# Patient Record
Sex: FEMALE | Race: WHITE
[De-identification: names, ages, dates, MRNs, and addresses within clinical notes are randomized per-mention and may not be internally consistent; named-entity substitution may affect disease eponyms.]

---

## 2017-11-14 ENCOUNTER — HOSPITAL ENCOUNTER (OUTPATIENT)
Dept: HOSPITAL 47 - LABWHC1 | Age: 77
Discharge: HOME | End: 2017-11-14
Payer: MEDICARE

## 2017-11-14 DIAGNOSIS — I10: Primary | ICD-10-CM

## 2017-11-14 DIAGNOSIS — E78.2: ICD-10-CM

## 2017-11-14 LAB
ALP SERPL-CCNC: 85 U/L (ref 38–126)
ALT SERPL-CCNC: 29 U/L (ref 9–52)
ANION GAP SERPL CALC-SCNC: 7 MMOL/L
AST SERPL-CCNC: 21 U/L (ref 14–36)
BASOPHILS # BLD AUTO: 0.1 K/UL (ref 0–0.2)
BASOPHILS NFR BLD AUTO: 1 %
BUN SERPL-SCNC: 12 MG/DL (ref 7–17)
CALCIUM SPEC-MCNC: 10.3 MG/DL (ref 8.4–10.2)
CH: 31.2
CHCM: 32.7
CHLORIDE SERPL-SCNC: 101 MMOL/L (ref 98–107)
CHOLEST SERPL-MCNC: 193 MG/DL (ref ?–200)
CO2 SERPL-SCNC: 31 MMOL/L (ref 22–30)
EOSINOPHIL # BLD AUTO: 0.2 K/UL (ref 0–0.7)
EOSINOPHIL NFR BLD AUTO: 3 %
ERYTHROCYTE [DISTWIDTH] IN BLOOD BY AUTOMATED COUNT: 4.54 M/UL (ref 3.8–5.4)
ERYTHROCYTE [DISTWIDTH] IN BLOOD: 13.3 % (ref 11.5–15.5)
GLUCOSE SERPL-MCNC: 99 MG/DL (ref 74–99)
HCT VFR BLD AUTO: 43.6 % (ref 34–46)
HDLC SERPL-MCNC: 84 MG/DL (ref 40–60)
HDW: 2.45
HGB BLD-MCNC: 13.5 GM/DL (ref 11.4–16)
LUC NFR BLD AUTO: 3 %
LYMPHOCYTES # SPEC AUTO: 2.7 K/UL (ref 1–4.8)
LYMPHOCYTES NFR SPEC AUTO: 37 %
MCH RBC QN AUTO: 29.8 PG (ref 25–35)
MCHC RBC AUTO-ENTMCNC: 31.1 G/DL (ref 31–37)
MCV RBC AUTO: 95.9 FL (ref 80–100)
MONOCYTES # BLD AUTO: 0.5 K/UL (ref 0–1)
MONOCYTES NFR BLD AUTO: 6 %
NEUTROPHILS # BLD AUTO: 3.6 K/UL (ref 1.3–7.7)
NEUTROPHILS NFR BLD AUTO: 50 %
NON-AFRICAN AMERICAN GFR(MDRD): >60
POTASSIUM SERPL-SCNC: 4.1 MMOL/L (ref 3.5–5.1)
PROT SERPL-MCNC: 7.3 G/DL (ref 6.3–8.2)
SODIUM SERPL-SCNC: 139 MMOL/L (ref 137–145)
WBC # BLD AUTO: 0.2 10*3/UL
WBC # BLD AUTO: 7.1 K/UL (ref 3.8–10.6)
WBC (PEROX): 7.37

## 2017-11-14 PROCEDURE — 85025 COMPLETE CBC W/AUTO DIFF WBC: CPT

## 2017-11-14 PROCEDURE — 36415 COLL VENOUS BLD VENIPUNCTURE: CPT

## 2017-11-14 PROCEDURE — 80061 LIPID PANEL: CPT

## 2017-11-14 PROCEDURE — 80053 COMPREHEN METABOLIC PANEL: CPT

## 2018-09-05 ENCOUNTER — HOSPITAL ENCOUNTER (OUTPATIENT)
Dept: HOSPITAL 47 - RADUSWWP | Age: 78
Discharge: HOME | End: 2018-09-05
Attending: FAMILY MEDICINE
Payer: MEDICARE

## 2018-09-05 DIAGNOSIS — N83.8: Primary | ICD-10-CM

## 2018-09-05 DIAGNOSIS — R93.5: ICD-10-CM

## 2018-09-05 PROCEDURE — 76856 US EXAM PELVIC COMPLETE: CPT

## 2018-09-05 PROCEDURE — 76830 TRANSVAGINAL US NON-OB: CPT

## 2018-09-05 PROCEDURE — 76700 US EXAM ABDOM COMPLETE: CPT

## 2018-09-05 NOTE — US
EXAMINATION TYPE: US abdomen complete

 

DATE OF EXAM: 9/5/2018

 

COMPARISON: None

 

CLINICAL HISTORY: 78-year-old female Abdominal Pain R10.9, abdominal bloating, feeling of fullness

 

FINDINGS:

 

EXAM MEASUREMENTS:

 

Liver Length:  16.1 cm   

Gallbladder Wall:  0.3 cm   

CBD:  0.4 cm

Spleen:  8.1 cm   

Right Kidney:  10.2 x 3.4 x 5.2 cm 

Left Kidney:  9.7 x 4.8 x 4.7cm   

 

Sonographer notes: Patient of large body habitus, technically difficult exam.

 

Pancreas:  Head partially obscured by overlying bowel gas

Liver: Cyst seen on previous CT not seen by today's ultrasound, this could be due to technical limita
tions  

Gallbladder: A couple nonmobile echogenic foci measuring 3 mm

**Evidence for sonographic Davila's sign: no

CBD:  wnl 

Spleen:  wnl   

Right Kidney:  upper pole obscured by overlying bowel gas   

Left Kidney:  upper pole obscured by overlying bowel gas   

No hydronephrosis on either side.

Upper IVC:  wnl  

Abd Aorta:  wnl

 

 

 

IMPRESSION: 

A couple nonmobile echogenic foci in the gallbladder measuring 3 mm suggestive of gallbladder wall po
lyps. A 6 month follow-up can be considered as a conservative measure.

 

Otherwise, some exam limitations; no specific abnormality seen.

## 2018-09-05 NOTE — US
EXAMINATION TYPE: US pelvis complete transvag

 

DATE OF EXAM: 9/5/2018

 

COMPARISON: NONE

 

CLINICAL HISTORY: 78-year-old female Abdominal Pain R10.9, bloating, feeling of fullness

 

TECHNIQUE: Transabdominal sonographic images of the pelvis were acquired.  Transvaginal sonographic i
mages were medically necessary to better assess the following anatomy: uterus and ovaries

 

Date of LMP:  24 years prior

 

FINDINGS:

 

EXAM MEASUREMENTS:

 

Uterus:  4.9 x 2.4 x 3.5 cm

Endometrial Stripe: 0.4 cm

Right Ovary:  not clearly identified, cyst seen in right adnexa possibly ovarian etiology.

Left Ovary:  not visualized due to atrophy/overlying bowel

 

Sonography notes: Patient of large body habitus. 

 

1. Uterus:  Retroverted, heterogeneous, unable to adequately image the uterus transvaginally due to u
terine position.

2. Endometrium:  wnl

3. Right Ovary:  Right adnexal cyst identified in post menopausal patient measuring 4.4 x 3.8 x 3.6cm
. There is a thin internal septation. No internal nodularity identified. 

4. Left Ovary: not visualized due to atrophy/overlying bowel

5. Bilateral Adnexa: right cyst seen

6. Posterior cul-de-sac:  wnl

 

 

 

IMPRESSION: 

Neither ovary could be clearly identified. There is a cystic lesion in the right adnexa probably of o
varian origin measuring 4.4 cm containing a thin internal septation. Recommend 3-6 month initial foll
ow-up interval. If stable at that time, annual surveillance can be performed per consensus guidelines
.

## 2018-12-12 ENCOUNTER — HOSPITAL ENCOUNTER (OUTPATIENT)
Dept: HOSPITAL 47 - ORWHC2ENDO | Age: 78
Discharge: HOME | End: 2018-12-12
Payer: MEDICARE

## 2018-12-12 VITALS — DIASTOLIC BLOOD PRESSURE: 79 MMHG | SYSTOLIC BLOOD PRESSURE: 164 MMHG | HEART RATE: 75 BPM

## 2018-12-12 VITALS — BODY MASS INDEX: 32.5 KG/M2

## 2018-12-12 VITALS — RESPIRATION RATE: 16 BRPM

## 2018-12-12 VITALS — TEMPERATURE: 98.4 F

## 2018-12-12 DIAGNOSIS — I10: ICD-10-CM

## 2018-12-12 DIAGNOSIS — K21.0: ICD-10-CM

## 2018-12-12 DIAGNOSIS — K22.10: ICD-10-CM

## 2018-12-12 DIAGNOSIS — Z96.642: ICD-10-CM

## 2018-12-12 DIAGNOSIS — K44.9: ICD-10-CM

## 2018-12-12 DIAGNOSIS — Z96.651: ICD-10-CM

## 2018-12-12 DIAGNOSIS — Z94.7: ICD-10-CM

## 2018-12-12 DIAGNOSIS — Z79.899: ICD-10-CM

## 2018-12-12 DIAGNOSIS — Z88.1: ICD-10-CM

## 2018-12-12 DIAGNOSIS — Z91.041: ICD-10-CM

## 2018-12-12 DIAGNOSIS — K29.30: Primary | ICD-10-CM

## 2018-12-12 DIAGNOSIS — M54.9: ICD-10-CM

## 2018-12-12 DIAGNOSIS — E78.5: ICD-10-CM

## 2018-12-12 DIAGNOSIS — M19.90: ICD-10-CM

## 2018-12-12 PROCEDURE — 88342 IMHCHEM/IMCYTCHM 1ST ANTB: CPT

## 2018-12-12 PROCEDURE — 43239 EGD BIOPSY SINGLE/MULTIPLE: CPT

## 2018-12-12 PROCEDURE — 88305 TISSUE EXAM BY PATHOLOGIST: CPT

## 2018-12-12 NOTE — P.PCN
Date of Procedure: 12/12/18


Description of Procedure: 











PREOPERATIVE DIAGNOSIS:


Gastroesophageal reflux disease.





POSTOPERATIVE DIAGNOSIS:


Gastritis.


Gastroesophageal reflux disease.


Diaphragmatic hiatal hernia





OPERATION:


Esophagogastroduodenoscopy with biopsies along antrum.





SURGEON: Shefali Lazcano MD





ANESTHESIA: MAC.





INDICATIONS:


The patient is a 78-year-old female who presents with a history of reflux 

disease. Benefits and risks of the procedure were described. Informed consent 

was obtained.





DESCRIPTION:


The patient was brought into the endoscopy suite and laid in the left lateral 

decubitus position. An Olympus gastroscope was passed along the posterior 

oropharynx down to the distal esophagus where the squamocolumnar junction was 

encountered at 36 cm from the incisors. The stomach was entered and no bile 

reflux was found.  Additional findings are listed below. Biopsies with cold 

forceps were obtained of the antrum. The first through third portion of the 

duodenum was examined and unremarkable. Retroflexion of the scope confirmed  

Hill grade 4 lower esophageal valve. The squamocolumnar junction demonstrated 

LA grade A erosive esophagitis. The stomach was desufflated. The patient 

tolerated the procedure well.





FINDINGS:


Squamocolumnar junction 36 cm from the incisors.


Diaphragmatic hiatus at 40 cm.


Hiatal hernia, 4 cm


Hill grade 4 lower esophageal valve.


LA grade A erosive esophagitis.


No active duodenitis.


Mild superficial chronic gastritis





RECOMMENDATIONS:


Upper endoscopy as needed.

## 2018-12-12 NOTE — P.GSHP
History of Present Illness


H&P Date: 12/12/18














CHIEF COMPLAINT: GERD





HISTORY OF PRESENT ILLNESS: The patient is a 78-year-old female who


presents reports gastroesophageal reflux disease.  Upper endoscopy was offered 

for further evaluation and management.





PAST MEDICAL HISTORY: 


Please see list.





PAST SURGICAL HISTORY: 


Please see list.





MEDICATIONS: 


Please see list.





ALLERGIES:  Please see list. 





SOCIAL HISTORY: No illicit drug use





FAMILY HISTORY: No reports of Crohn disease or ulcerative colitis. 





REVIEW OF ORGAN SYSTEMS: 


CONSTITUTIONAL: No reports of fevers or chills. 


GI:  Denies any blood in stools or constipation. 





PHYSICAL EXAM: 


VITAL SIGNS:  Stable


GENERAL: Well-developed and pleasant in no acute distress. 


HEENT: No scleral icterus. Extraocular movements grossly


intact. Moist buccal mucosa. 


NECK: Supple without lymphadenopathy. 


CHEST: Unlabored respirations. Equal bilateral excursions. 


CARDIOVASCULAR: Regular rate and rhythm. Distal 2+ pulses. 


ABDOMEN: Soft, nondistended.  


MUSCULOSKELETAL: No clubbing, cyanosis, or edema. 





ASSESSMENT: 


1.  Gastroesophageal reflux disease





PLAN: 


1. Recommend proceeding with an upper endoscopy





Past Medical History


Past Medical History: Hyperlipidemia, Hypertension, Osteoarthritis (OA)


Additional Past Medical History / Comment(s): BACK PAIN, ENVIRONMENTAL 

ALLERGIES., STATES HAVING STOMACH PAIN AFTER EATING.


History of Any Multi-Drug Resistant Organisms: None Reported


Past Surgical History: Joint Replacement, Orthopedic Surgery, Tubal Ligation


Additional Past Surgical History / Comment(s): BILAT CATARACT SURGERY, CARPAL 

TUNNEL.  LT CORNEAL TRANSPLANT X2.  COLONOSCOPY.  RT TKA.  LT PARTIAL RADHA


Past Anesthesia/Blood Transfusion Reactions: No Reported Reaction


Past Psychological History: No Psychological Hx Reported


Smoking Status: Never smoker


Past Alcohol Use History: None Reported


Past Drug Use History: None Reported





- Past Family History


  ** Mother


Family Medical History: Unable to Obtain


Additional Family Medical History / Comment(s): PT DOES NOT KNOW FAMILY HX.





Medications and Allergies


 Home Medications











 Medication  Instructions  Recorded  Confirmed  Type


 


Diltiazem HCl [Diltiazem ER (BID)] 90 mg PO BID 05/03/14 12/10/18 History


 


Lisinopril-Hctz 20-25 mg 1 each PO DAILY 05/03/14 12/10/18 History





[Zestoretic 20-25]    


 


Atorvastatin [Lipitor] 10 mg PO HS 12/10/18 12/10/18 History


 


Cetirizine HCl 10 mg PO HS PRN 12/10/18 12/10/18 History


 


HYDROcodone/APAP 5-325MG [Norco 1 tab PO Q6HR PRN 12/10/18 12/10/18 History





5-325]    


 


Inulin/Chromium Picolinate [Fiber 2 each PO DAILY 12/10/18 12/10/18 History





Gummies Chew]    


 


Loratadine 10 mg PO DAILY PRN 12/10/18 12/10/18 History


 


Sinus Otc 1 tab PO DAILY PRN 12/10/18  History


 


Sodium Chloride 5% Ophth Soln 1 drops RIGHT EYE BID 12/10/18 12/10/18 History





[Lenin 128]    


 


prednisoLONE ACETATE 1% OPHTH 1 drops LEFT EYE AS DIRECTED 12/10/18 12/10/18 

History





[Pred Forte 1%]    











 Allergies











Allergy/AdvReac Type Severity Reaction Status Date / Time


 


Iodinated Contrast- Oral and AdvReac  Nausea & Verified 12/10/18 08:19





IV Dye   Vomiting  





[Iodinated Contrast Media -     





IV Dye]

## 2020-09-03 ENCOUNTER — HOSPITAL ENCOUNTER (OUTPATIENT)
Dept: HOSPITAL 47 - LABPAT | Age: 80
Discharge: HOME | End: 2020-09-03
Attending: ORTHOPAEDIC SURGERY
Payer: MEDICARE

## 2020-09-03 DIAGNOSIS — Z01.812: Primary | ICD-10-CM

## 2020-09-03 DIAGNOSIS — Z01.818: ICD-10-CM

## 2020-09-03 LAB
ALBUMIN SERPL-MCNC: 4.3 G/DL (ref 3.5–5)
ALP SERPL-CCNC: 115 U/L (ref 38–126)
ALT SERPL-CCNC: 13 U/L (ref 4–34)
ANION GAP SERPL CALC-SCNC: 7 MMOL/L
APTT BLD: 22.5 SEC (ref 22–30)
AST SERPL-CCNC: 24 U/L (ref 14–36)
BUN SERPL-SCNC: 14 MG/DL (ref 7–17)
CALCIUM SPEC-MCNC: 10 MG/DL (ref 8.4–10.2)
CHLORIDE SERPL-SCNC: 101 MMOL/L (ref 98–107)
CO2 SERPL-SCNC: 29 MMOL/L (ref 22–30)
ERYTHROCYTE [DISTWIDTH] IN BLOOD BY AUTOMATED COUNT: 4.38 M/UL (ref 3.8–5.4)
ERYTHROCYTE [DISTWIDTH] IN BLOOD: 13.4 % (ref 11.5–15.5)
GLUCOSE SERPL-MCNC: 99 MG/DL (ref 74–99)
HCT VFR BLD AUTO: 41.3 % (ref 34–46)
HGB BLD-MCNC: 13.1 GM/DL (ref 11.4–16)
INR PPP: 0.9 (ref ?–1.2)
MCH RBC QN AUTO: 30 PG (ref 25–35)
MCHC RBC AUTO-ENTMCNC: 31.9 G/DL (ref 31–37)
MCV RBC AUTO: 94.3 FL (ref 80–100)
PH UR: 6.5 [PH] (ref 5–8)
PLATELET # BLD AUTO: 282 K/UL (ref 150–450)
POTASSIUM SERPL-SCNC: 4.1 MMOL/L (ref 3.5–5.1)
PROT SERPL-MCNC: 6.8 G/DL (ref 6.3–8.2)
PT BLD: 9.7 SEC (ref 9–12)
RBC UR QL: 2 /HPF (ref 0–5)
SODIUM SERPL-SCNC: 137 MMOL/L (ref 137–145)
SP GR UR: 1.02 (ref 1–1.03)
SQUAMOUS UR QL AUTO: 1 /HPF (ref 0–4)
UROBILINOGEN UR QL STRIP: <2 MG/DL (ref ?–2)
WBC # BLD AUTO: 8.2 K/UL (ref 3.8–10.6)
WBC #/AREA URNS HPF: 10 /HPF (ref 0–5)

## 2020-09-03 PROCEDURE — 85610 PROTHROMBIN TIME: CPT

## 2020-09-03 PROCEDURE — 85027 COMPLETE CBC AUTOMATED: CPT

## 2020-09-03 PROCEDURE — 80053 COMPREHEN METABOLIC PANEL: CPT

## 2020-09-03 PROCEDURE — 87070 CULTURE OTHR SPECIMN AEROBIC: CPT

## 2020-09-03 PROCEDURE — 85730 THROMBOPLASTIN TIME PARTIAL: CPT

## 2020-09-03 PROCEDURE — 81001 URINALYSIS AUTO W/SCOPE: CPT

## 2020-09-14 ENCOUNTER — HOSPITAL ENCOUNTER (OUTPATIENT)
Dept: HOSPITAL 47 - OR | Age: 80
Discharge: HOME HEALTH | End: 2020-09-14
Attending: ORTHOPAEDIC SURGERY
Payer: MEDICARE

## 2020-09-14 VITALS — TEMPERATURE: 97.5 F | HEART RATE: 103 BPM | SYSTOLIC BLOOD PRESSURE: 144 MMHG | DIASTOLIC BLOOD PRESSURE: 72 MMHG

## 2020-09-14 VITALS — BODY MASS INDEX: 32.2 KG/M2

## 2020-09-14 VITALS — RESPIRATION RATE: 18 BRPM

## 2020-09-14 DIAGNOSIS — K44.9: ICD-10-CM

## 2020-09-14 DIAGNOSIS — J30.2: ICD-10-CM

## 2020-09-14 DIAGNOSIS — Z97.3: ICD-10-CM

## 2020-09-14 DIAGNOSIS — Z98.42: ICD-10-CM

## 2020-09-14 DIAGNOSIS — Z83.1: ICD-10-CM

## 2020-09-14 DIAGNOSIS — I10: ICD-10-CM

## 2020-09-14 DIAGNOSIS — Z88.1: ICD-10-CM

## 2020-09-14 DIAGNOSIS — K57.90: ICD-10-CM

## 2020-09-14 DIAGNOSIS — Z98.51: ICD-10-CM

## 2020-09-14 DIAGNOSIS — Z79.51: ICD-10-CM

## 2020-09-14 DIAGNOSIS — Z79.899: ICD-10-CM

## 2020-09-14 DIAGNOSIS — E78.2: ICD-10-CM

## 2020-09-14 DIAGNOSIS — K21.9: ICD-10-CM

## 2020-09-14 DIAGNOSIS — Z91.041: ICD-10-CM

## 2020-09-14 DIAGNOSIS — Z98.41: ICD-10-CM

## 2020-09-14 DIAGNOSIS — Z96.642: ICD-10-CM

## 2020-09-14 DIAGNOSIS — M21.162: ICD-10-CM

## 2020-09-14 DIAGNOSIS — Z96.651: ICD-10-CM

## 2020-09-14 DIAGNOSIS — Z98.890: ICD-10-CM

## 2020-09-14 DIAGNOSIS — M17.12: Primary | ICD-10-CM

## 2020-09-14 PROCEDURE — 73560 X-RAY EXAM OF KNEE 1 OR 2: CPT

## 2020-09-14 PROCEDURE — 27447 TOTAL KNEE ARTHROPLASTY: CPT

## 2020-09-14 PROCEDURE — 88300 SURGICAL PATH GROSS: CPT

## 2020-09-14 PROCEDURE — 64448 NJX AA&/STRD FEM NRV NFS IMG: CPT

## 2020-09-14 PROCEDURE — 76942 ECHO GUIDE FOR BIOPSY: CPT

## 2020-09-14 RX ADMIN — SODIUM CHLORIDE ONE MG: 9 INJECTION, SOLUTION INTRAVENOUS at 13:42

## 2020-09-14 RX ADMIN — SODIUM CHLORIDE ONE MG: 9 INJECTION, SOLUTION INTRAVENOUS at 13:05

## 2020-09-14 NOTE — P.OP
Date of Procedure: 09/14/20


Procedure(s) Performed: 


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





OPERATION: Left knee cemented total replacement arthroplasty.


 


ANESTHESIA: Spinal





ESTIMATED BLOOD LOSS: 100 ml.





ASSISTANT: Catalina Raymundo PA-C (assistance with: patient positioning, retraction, 

exposure, hemostasis, leg positioning, implantation, irrigation, closure, 

dressing)





COMPLICATIONS: None apparent. 





COMPONENTS IMPLANTED: Persona system from [employer]





INDICATIONS:  Mrs. Bland is a 80 year old female with a history of left knee 

osteoarthritis.  Conservative treatment has been tried and has been unsuccessful

in controlling symptoms adequately.  The operation of knee replacement has been 

discussed at length in the office, as well as potential risks and complications.

 These are inclusive of, but not limited to: bleeding, infection, scarring, 

discomfort, blood vessel and nerve damage, need for further surgery, failure to 

relieve symptoms, persistence, recurrence, or worsening of problems, loosening, 

dislocation, wear, blood clot, pulmonary embolism, death, gait dysfunction, 

stiffness, and other risks as discussed in the office.  The patient elects to 

proceed and the consent form has been signed. 





PROCEDURE: The patient was taken to the operating room and positioned on the 

operating room table in the supine position.  Anesthesia was initiated.  Care 

was taken to make sure that all pressure points were adequately padded. The 

operative lower extremity was prepped and draped in the usual aseptic fashion 

using ChloraPrep.  Ioban drape was used for the case and the patient received 

intravenous antibiotics within one hour of the incision. A pneumotourniquet and 

leg nuñez were used for the case. The limb was exsanguinated with an Esmarch 

bandage and the tourniquet was inflated to 350 mmHg. Time-out was called 

confirming the patient's identity, side, procedure and administration of 

antibiotics and tranexamic acid, 1 g IV.





 The incision was then created midline directly over the knee, carried down 

through skin and into the subcutaneous tissues and down to fascia. Full 

thickness subcutaneous medial flap was developed.  Medial parapatellar 

arthrotomy was performed and the interior of the knee was inspected. There was 

end-stage osteoarthritis of the knee with a mild to moderate genu varum type 

deformity. The fat pad was excised and proximal medial release on the tibia was 

completed using meticulous dissection and a curved osteotome. The anterior 

cruciate ligament was taken down.  Note was made of significant attrition of the

anterior and significant degenerative appearance of the posterior cruciate 

ligaments. The exposure was excellent. 





The knee was flexed 90 degrees and the patella was everted. A spot was chosen on

the femur approximately 1 cm anterior to the posterior cruciate ligament 

insertion and an intramedullary hole was created within the femur. The 

intramedullary guide was then set to 5 degrees of valgus. The distal cutting 

block was attached and pinned into position. An appropriate amount of distal 

femoral resection was set. The oscillating saw was then used to make the distal 

femoral cut. This cut was confirmed to be flat with the flat end of an 

osteotome. 





The retractors were placed around the tibia and the tibial surface was 

addressed. The angle and depth of resection was adjusted using an extramedullary

cutting guide. The guide had a built-in 3 degree posterior slope cut.  Once the 

cutting guide was adjusted appropriately and in line with the axis of the tibia 

and confirmed to be in good position in relation to the second metatarsal and 

transmalleolar axis, the tibial cut was then created with protection of the 

posterior neurovascular structures and the collateral ligaments. The tibial cut 

surface was removed and sized.





Femoral sizing was then accomplished using anterior  referencing.  Care was 

taken to analyze the posterior condyles for signs of deficiency or severe wear, 

and adjustments to the guide were made, as appropriate.   3 degree external 

rotation pins were placed.  The cutting jig for the femur was applied to these 

pins.  The planned cuts were further analyzed prior to performing them with the 

oscillating saw. No femoral notching was produced. Bone fragments were removed 

and the cut surfaces were finished, as necessary, with a reciprocating saw.





Spacer block technique was then used to confirm that the flexion and extension 

gaps were equal.  Soft tissue releases and adjustment of the tibial and/or 

femoral cuts were made, as necessary, until the gaps were equal. This included 

release of the posterior cruciate ligament, which was tight in this patient.  

The femur was then further finished for a posterior cruciate ligament 

substituting component. 





Patellar resurfacing was performed using a reamer.  The size of the required 

patellar component was estimated and the patellar surface was then reamed down 

to a residual thickness which would recreate the native thickness with the 

component.  The exact placement of the patellar component was adjusted for 

position based on preoperative x-rays and intraoperative findings. 





Prior to placing trial components, anesthetic solution consisting of ropivicaine

with epinephrine, ketorolac, and clonidine was injected carefully and 

methodically in a grid pattern using aspiration technique into the soft tissue 

around the knee circumferentially, starting with the deeper tissues first and 

progressing to fascia, and then finally the skin/subcutaneous tissue.  

Particular care was taken when injecting the posterior capsule. 





The trial components were inserted. The tibial tray was allowed to self center 

and the patella was noted to track very well. The position of the tibial 

component was marked and the tibia was then finished for a stemmed tibial 

component. Cement was mixed on the back table and applied to the final 

components. Trial components were removed and the cut surfaces of the bone were 

pulse lavaged thoroughly and dried. Cement was then applied to the tibial martha

face and pressurized into the surface using finger pressurization technique. The

tibial component was then applied and excess cement was removed after it was 

impacted securely and noted to be flush with the cut surface. 





In similar fashion, the cement was applied to the cut femoral surface, 

pressurized in using finger pressurization and the component was impacted into 

place. Excess cement was removed. The polyethylene spacer was then implanted and

locked into position. The patellar component was then applied in similar 

technique and a patellar clamp was used to hold the patella in place as the 

cement hardened. Once the cement had fully hardened, the knee was reinspected. 

Any other cement extrusion was removed and final kinematic testing showed range 

of motion from 0 to 130 degrees with excellent stability, both medially and 

laterally and appropriate alignment of the leg. Patellar tracking was excellent.







The knee was then thoroughly pulse lavaged with normal saline. The tourniquet wa

s deflated and hemostasis was obtained with electrocautery and IV tranexamic 

acid, 1 g given prior to inflation of the tourniquet and another gram given at 

the time of closure.  Closure was with #2 Ethibond in the fascia and 

supplemented with #2 Quill, 2-0 Vicryl suture was used for the subcutaneous 

tissues and 3-0 Quill for the skin. Dermabond/Steri-Strips were then applied. A 

lightly compressive dressing was applied using Webril and an Ace wrap. The 

patient was then transferred to stretcher and taken to the recovery room in 

stable condition.  Sponge and needle counts were correct.

## 2020-09-14 NOTE — XR
EXAMINATION TYPE: XR knee limited LT

 

DATE OF EXAM: 9/14/2020

 

CLINICAL HISTORY: Left knee pain and arthritis status post total knee replacement.

 

TECHNIQUE:  Portable AP and crosstable lateral views of the left knee are obtained immediately postop
eratively.

 

COMPARISON: None

 

FINDINGS:  Metallic hardware from total left knee arthroplasty is seen and appears satisfactory in al
ignment and position.  There is evidence of recent surgery with diffuse subcutaneous gas and cutaneou
s irregularity. There is no unexpected radiopaque foreign body. 

 

IMPRESSION:  METALLIC HARDWARE FROM TOTAL LEFT KNEE ARTHROPLASTY IS SATISFACTORY IN ALIGNMENT.

## 2020-09-14 NOTE — P.ANPRN
Procedure Note - Anesthesia





- Nerve Block Performed


  ** Left Adductor Canal Infusion


Time Out Performed: Yes


Date of Procedure: 09/14/20


Procedure Start Time: 11:46


Procedure Stop Time: 11:55


Location of Patient: PreOp


Indication: Acute Post-Operative Pain, Requested by Surgeon


Sedation Type: Sedate with meaningful contact maintained


Preparation: Sterile Prep, Sterile Dressing


Position: Supine


Catheter: Indwelling


Needle Types: Pajunk


Needle Gauge: 21


Ultrasound used to visualize needle placement: Yes


Ultrasound used to observe medication spread: Yes


Blood Aspirated: No


Pain Paresthesia on Injection Noted: No


Resistance on Injection: Normal


Image Stored and Saved: Yes


Events: Uneventful and Well Tolerated (ropi .5% 20cc plus dexamethasone 4mg)

## 2023-07-13 ENCOUNTER — HOSPITAL ENCOUNTER (OUTPATIENT)
Dept: HOSPITAL 47 - PNWHC3 | Age: 83
End: 2023-07-13
Attending: ANESTHESIOLOGY
Payer: MEDICARE

## 2023-07-13 VITALS — DIASTOLIC BLOOD PRESSURE: 78 MMHG | RESPIRATION RATE: 18 BRPM | HEART RATE: 82 BPM | SYSTOLIC BLOOD PRESSURE: 162 MMHG

## 2023-07-13 DIAGNOSIS — E78.5: ICD-10-CM

## 2023-07-13 DIAGNOSIS — M19.90: ICD-10-CM

## 2023-07-13 DIAGNOSIS — Z79.82: ICD-10-CM

## 2023-07-13 DIAGNOSIS — I10: ICD-10-CM

## 2023-07-13 DIAGNOSIS — Z91.041: ICD-10-CM

## 2023-07-13 DIAGNOSIS — M51.36: Primary | ICD-10-CM

## 2023-07-13 PROCEDURE — 99211 OFF/OP EST MAY X REQ PHY/QHP: CPT

## 2023-07-13 NOTE — P.PAINPG
PQRS Measure Charge Sheet


Comment: 





HISTORY OF PRESENT ILLNESS:


83 yr old female w  at side as a referral from Dr Mosley presents today w 

severe and chronic LBP secondary to DDD, spondylosis and facet arthropathy 

without myelopathy for evaluation. Pt states pain level is provoked at  10/10 in

intensity, constant, localized in the mid to lower lumbar spine, achy in 

character w shooting pain towards the L Knee.  Pain is provoked by weight 

bearing activity.  Pain is alleviated by medications (Aleve), topical, heat, PT 

eyars ago, physician guided home exercises 3 times weekly since 2020, 

repositioning and rest. Oswestry axial pain score at  26. 





PMH: OA, Hyperlipidemia, HTN, OA


PSH: Colonoscopy (2014), EGD w Biopsy (2018), L Total Knee Arthroplasty (2020), 

R Total Knee Arthroplasty, Tubal Ligation, BL Cataract Surgery, Carpal Tunnel 

Release, L Corneal Transplant x2


SH: Negative x3


FH: Unaware of family history


All: See list


Meds: See list





REVIEW OF ORGAN SYSTEMS:


                     CONSTITUTIONAL:  No fevers or chills. No recent weight 

loss.


                     NEUROLOGICAL: +  numbness and tingling along the distal 

extremities. No seizure disorders or headaches.


                     MUSCULOSKELETAL: + pain 


                     PSYCHIATRIC:  Denies current depression or suicidal 

thoughts.


    


Physical Examinations  :


                Constitutional : Cooperative , not in acute distress .          

                                                                                

                                                                                

                                                                                

                                                                                

     


                Neurologic :   Cranial nerve II   to  XII  intact. No focal 

neurological deficits.


                Psychiatric : alert & oriented  x 3. Matching mood & appropriate

affect. Judgment & insight intact. 


                Musculoskeletal :     


                               Cervical Spine 


                                         Motor strength in the deltoid and 

biceps: Normal  right side. Normal  Left side


                                         Motor strength biceps and the wrist 

extensors:   Normal right side . Normal left side 


                                         Motor strength in the triceps muscle: 

Normal right side.  Normal  left side  


                                         Deep tendon reflexes:  Normal at the 

biceps. Normal at Brachioradialis. Normal at triceps


                                         Vertebral body tenderness to deep 

palpation 


                                         Cervical facet loading test: positive 

bilaterally


                                         Spurling test: positive bilaterally


                                         Neck distraction test: positive 

bilaterally


                                         Milana sign: positive bilaterally


                                  Lumbar spine


                                         Motor strength lower extremities ,thigh

and legs  5/5 Right side ,  5/5  Left side 


                                         Deep tendon reflexes :   Normal  Knee 

Jerk. Normal   Ankle Jerk  


                                         Vertebral body tenderness over L5


                                         Sommer Test positive


                                         Lumbar facet Loading Test: positive 

Right  / positive Left  


                                         Range of motion of the lumbar spine  

Flexion  30 degrees,   extension   10 degrees


                                         Straight Leg Raise test: Left/ Right 

positive at 35  degrees  


                                         Maryjo test: positive right /  positive 

left.


                                         Severe tenderness over the Sacroiliac 

joint  on the Right / Left  sides   


                                         Gaenslen  test: positive bilaterally


                                         Seated flexion test: positive 

bilaterally.


                                 Sacral spine :


                                         Severe tenderness over the Sacroiliac 

joint:  right side / left side 


                                         Range of motion: Flexion of the lumbar 

spine <60 degrees


                                         Range of motion: Extension of the 

lumbar spine <20 degrees


                                         Gaenslen's Test positive 


                                         River's Test positive 


                                         Maryjo test: positive  right side /  

left side


                                         Thigh Thrust Test


                                         Sacral Thrust Test


Imaging:


MRI non contrast of the lumbar spine from 6/2/23 reviewed





Assessment/ Plan : 


Lumbar DDD


Recommendation of BROOKE L4-L5. May need a series of injections for optimal pain 

relief. Risks, benefits of procedure discussed and patient verbalized 

understanding. Admits to aspirin or anti- coagulant use or medical history of 

diabetes. Protocol for discontinuation/ continuation of medications forest 

procedure discussed. Minimal anesthesia provided, if clinically indicated, 

consisting of Versed and Fentanyl. All questions answered.


I have spent greater than 30 minutes on patient care today. Dr Pierre was 

available by phone for the evaluation of this patient. The time was used to 

review the medical records including relevant urine studies and Prescription 

history (MAPs), review of the available imaging, evaluation and examination of 

the patient, coordination of care with the medical staff and if applicable 

referring physicians, as well as creation of the medical record





PQRS Narrative: 


                                        





Smoking Status                   Never smoker








Home Medications: 


Ambulatory Orders





Lisinopril-Hctz 20-25 mg [Zestoretic 20-25] 1 each PO DAILY 05/03/14 


dilTIAZem HCL [Diltiazem ER (BID)] 90 mg PO BID 05/03/14 


Atorvastatin [Lipitor] 10 mg PO HS 12/10/18 


Sodium Chloride 5% Ophth Soln [Lenin 128] 1 drops RIGHT EYE BID 12/10/18 


prednisoLONE ACETATE 1% OPHTH [Pred Forte 1%] 1 drops LEFT EYE Q48H 12/10/18 


Aspirin [Adult Low Dose Aspirin EC] 81 mg PO BID #1 tablet. 09/14/20 


HYDROcodone/APAP 7.5-325MG [Norco 7.5-325] 1 - 2 tab PO Q4-6H PRN #42 tab 

09/14/20 


Meloxicam [Mobic] 1 - 2 tab PO DAILY PRN #30 tab 09/14/20 


Sennosides-Docusate Sodium [Senokot-S] 1 tab PO BID #60 tablet 09/14/20 


hydrOXYzine pamoate [Vistaril] 25 mg PO Q4-6H #30 capsule 09/14/20 


HYDROcodone/APAP 7.5-325MG [Norco 7.5-325] 1 - 2 tab PO Q6HR PRN #42 tab 

09/15/20 











Controlled Substance Measures





- Controlled Substance Measures


Is patient prescribed a controlled substance at discharge?: No

## 2023-07-27 ENCOUNTER — HOSPITAL ENCOUNTER (OUTPATIENT)
Dept: HOSPITAL 47 - ORPAIN | Age: 83
Discharge: HOME | End: 2023-07-27
Attending: ANESTHESIOLOGY
Payer: MEDICARE

## 2023-07-27 VITALS — DIASTOLIC BLOOD PRESSURE: 70 MMHG | SYSTOLIC BLOOD PRESSURE: 147 MMHG | HEART RATE: 74 BPM

## 2023-07-27 VITALS — TEMPERATURE: 97.5 F | RESPIRATION RATE: 16 BRPM

## 2023-07-27 VITALS — BODY MASS INDEX: 31.8 KG/M2

## 2023-07-27 DIAGNOSIS — M51.16: Primary | ICD-10-CM

## 2023-07-27 DIAGNOSIS — M47.26: ICD-10-CM

## 2023-07-27 DIAGNOSIS — Z91.041: ICD-10-CM

## 2023-07-27 PROCEDURE — 62323 NJX INTERLAMINAR LMBR/SAC: CPT

## 2023-07-27 NOTE — FL
Intraoperative/procedural fluoroscopic services were provided. Total fluoroscopy time is 6 seconds wi
th a total of 1 submitted images to PACS. Please see the operative/procedural note for further detail
s.

DAP: [id number] [id number]

## 2023-07-27 NOTE — P.PCN
Date of Procedure: 07/27/23


Procedure(s) Performed: 


 


PREOPERATIVE DIAGNOSIS: 


1- Lumbar Degenerative Disc Diseases


2-Lumbar spondylosis 


POSTOPERATIVE DIAGNOSIS: 


1-lumbar degenerative disc disease.


2-lumbar spondylosis .





PROCEDURE


1. Lumbar epidural steroid injection under fluoroscopic guidance at the L4-5 

level.    (Fluoroscopy imaging was available in radiology department)


 


ANESTHESIA: Lidocaine 1% 3 and then only.


EBL: Minimal


PROCEDURE INDICATION: The patient with low back pain and radiculitis symptoms 

unresponsive to conservative treatment. Fluoroscopy was used to optimize 

visualization of the needle placement and to maximize safety. 


PROCEDURE DESCRIPTION / TECHNIQUE: 


  The patient was seen and identified in the preoperative area. Risks, benefits,

complications including but not limited to infections ,bleeding ,allergic 

reaction to the medications ,nerve damage and not complete pain releife , and 

alternatives were discussed with the patient. The patient agreed to proceed with

the procedure and signed the consent, and vital signs were stable.


  Patient was taken to the OR and time out was completed. The patient was placed

 in the prone position on procedure table and a pillow was placed under the 

abdomen to reduce lumbar lordosis. The  lumbosacral area was prepped  and draped

in the usual sterile fashion.ere closely monitored during the procedure.  Vital 

signs was monitered during the entire procedure.


Using anterior-posterior fluoroscopy, the L5-S1 interlaminar space was i

dentified and the skin over this site was marked and then infiltrated with 1% 

lidocaine subcutaneously. Subsequently, a 20-gauge Tuohy epidural needle was 

inserted and advanced toward the epidural space using the ``Loss of resistance 

technique and guided by AP and lateral fluoroscopy., after negative aspiration 

for blood and CSF and in the absence of paresthesias. Again after negative 

aspiration, a 6  ml mixture containing 40 mg of Depo-medrol ( Preservetive Free 

), and 2  ml of preservative free Normal Saline, and 2 ml of preservative free 

lidocaine 1% solution was injected . Needle was withdrawn intact, skin was 

cleansed, and bandages were applied. 


COMPLICATIONS: None


DISPOSITION / PLANS: The patient was placed in a supine position and transferred

to the recovery area in a stable condition for observation. There was no 

evidence of lower extremity motor or sensory deficit after the procedure.  

Patient was discharged from the recovery room after meeting discharge criteria. 

Home discharge instructions were given to the patient by the staff. The patient 

was reexamined prior to discharge. The patient will schedule a follow up in the 

clinic in 2-4 weeks.





note= Isovue was not injected because patient had an ALLERGY to contrast media

## 2023-08-17 ENCOUNTER — HOSPITAL ENCOUNTER (OUTPATIENT)
Dept: HOSPITAL 47 - PNWHC3 | Age: 83
End: 2023-08-17
Attending: ANESTHESIOLOGY
Payer: MEDICARE

## 2023-08-17 VITALS
TEMPERATURE: 98.3 F | HEART RATE: 81 BPM | SYSTOLIC BLOOD PRESSURE: 186 MMHG | DIASTOLIC BLOOD PRESSURE: 79 MMHG | RESPIRATION RATE: 15 BRPM

## 2023-08-17 DIAGNOSIS — Z91.041: ICD-10-CM

## 2023-08-17 DIAGNOSIS — M51.36: Primary | ICD-10-CM

## 2023-08-17 PROCEDURE — 99211 OFF/OP EST MAY X REQ PHY/QHP: CPT

## 2023-08-17 NOTE — P.PAINPG
PQRS Measure Charge Sheet


Comment: 





HISTORY OF PRESENT ILLNESS:


83 yr old female w  at side presents today w severe and chronic LBP 

secondary to DDD, spondylosis and facet arthropathy without myelopathy for 

evaluation s/p BROOKE L4-L5 #1. Pt states she experienced 90 % pain relief x 3 wks 

s/p procedure. Pt states pain level is provoked at 1/10 in intensity. Pain is 

alleviated by injections, medications, topical, heat, PT eyars ago, physician 

guided home exercises 3 times weekly since 2020, repositioning and rest. 

Oswestry axial pain score at  26. 





Interventional procedures include BROOKE L4-L5 x1


Medications include Aleve





REVIEW OF ORGAN SYSTEMS:


                     CONSTITUTIONAL:  No fevers or chills. No recent weight 

loss.


                     NEUROLOGICAL: +  numbness and tingling along the distal 

extremities. No seizure disorders or headaches.


                     MUSCULOSKELETAL: + pain 


                     PSYCHIATRIC:  Denies current depression or suicidal 

thoughts.


    


Physical Examinations  :


                Constitutional : Cooperative , not in acute distress .          

                                                                                

                                                                                

                                                                                

                                                                                

     


                Neurologic :   Cranial nerve II   to  XII  intact. No focal 

neurological deficits.


                Psychiatric : alert & oriented  x 3. Matching mood & appropriate

affect. Judgment & insight intact. 


                Musculoskeletal :     


                               Cervical Spine 


                                         Motor strength in the deltoid and 

biceps: Normal  right side. Normal  Left side


                                         Motor strength biceps and the wrist 

extensors:   Normal right side . Normal left side 


                                         Motor strength in the triceps muscle: 

Normal right side.  Normal  left side  


                                         Deep tendon reflexes:  Normal at the 

biceps. Normal at Brachioradialis. Normal at triceps


                                         Vertebral body tenderness to deep 

palpation 


                                         Cervical facet loading test: positive 

bilaterally


                                         Spurling test: positive bilaterally


                                         Neck distraction test: positive 

bilaterally


                                         Milana sign: positive bilaterally


                                  Lumbar spine


                                         Motor strength lower extremities ,thigh

and legs  5/5 Right side ,  5/5  Left side 


                                         Deep tendon reflexes :   Normal  Knee 

Jerk. Normal   Ankle Jerk  


                                         Vertebral body tenderness over L5


                                         Sommer Test positive


                                         Lumbar facet Loading Test: positive 

Right  / positive Left  


                                         Range of motion of the lumbar spine  

Flexion  30 degrees,   extension   10 degrees


                                         Straight Leg Raise test: Left/ Right 

positive at 35  degrees  


                                         Maryjo test: positive right /  positive 

left.


                                         Severe tenderness over the Sacroiliac 

joint  on the Right / Left  sides   


                                         Gaenslen  test: positive bilaterally


                                         Seated flexion test: positive 

bilaterally.


                                 Sacral spine :


                                         Severe tenderness over the Sacroiliac 

joint:  right side / left side 


                                         Range of motion: Flexion of the lumbar 

spine <60 degrees


                                         Range of motion: Extension of the 

lumbar spine <20 degrees


                                         Gaenslen's Test positive 


                                         River's Test positive 


                                         Maryjo test: positive  right side /  

left side


                                         Thigh Thrust Test


                                         Sacral Thrust Test


Imaging:


MRI non contrast of the lumbar spine from 6/2/23 reviewed





Assessment/ Plan : 


Lumbar DDD


Recommendation of medication management Mobic 7.5mg #30 1 RF. Use, side effects,

adverse reactions and safe storage discussed. Pt and  at side 

acknowledged understanding. May need a series of injections for optimal pain 

relief. All questions answered.


I have spent greater than 30 minutes on patient care today. Dr Pierre was 

available by phone for the evaluation of this patient. The time was used to 

review the medical records including relevant urine studies and Prescription 

history (MAPs), review of the available imaging, evaluation and examination of 

the patient, coordination of care with the medical staff and if applicable 

referring physicians, as well as creation of the medical record





PQRS Narrative: 


                                        





Smoking Status                   Never smoker


Hx Alcohol Use (MH)              No








Home Medications: 


Ambulatory Orders





Lisinopril-Hctz 20-25 mg [Zestoretic 20-25] 1 each PO DAILY 05/03/14 


dilTIAZem HCL [Diltiazem ER (BID)] 90 mg PO BID 05/03/14 


Atorvastatin [Lipitor] 10 mg PO HS 12/10/18 


Sodium Chloride 5% Ophth Soln [Lenin 128] 1 drops RIGHT EYE BID 12/10/18 


prednisoLONE ACETATE 1% OPHTH [Pred Forte 1%] 1 drops LEFT EYE Q48H 12/10/18 











Controlled Substance Measures





- Controlled Substance Measures


Is patient prescribed a controlled substance at discharge?: No

## 2024-12-13 ENCOUNTER — HOSPITAL ENCOUNTER (EMERGENCY)
Dept: HOSPITAL 47 - EC | Age: 84
Discharge: OTHER INSTITUTION | End: 2024-12-13
Payer: MEDICARE

## 2024-12-13 VITALS
TEMPERATURE: 98.1 F | HEART RATE: 95 BPM | SYSTOLIC BLOOD PRESSURE: 146 MMHG | RESPIRATION RATE: 17 BRPM | DIASTOLIC BLOOD PRESSURE: 82 MMHG

## 2024-12-13 DIAGNOSIS — I10: ICD-10-CM

## 2024-12-13 DIAGNOSIS — Z91.041: ICD-10-CM

## 2024-12-13 DIAGNOSIS — E78.5: ICD-10-CM

## 2024-12-13 DIAGNOSIS — S06.5X0A: ICD-10-CM

## 2024-12-13 DIAGNOSIS — Z23: ICD-10-CM
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
## 2024-12-13 DIAGNOSIS — I48.91: ICD-10-CM

## 2024-12-13 DIAGNOSIS — S01.01XA: Primary | ICD-10-CM

## 2024-12-13 LAB
ALBUMIN SERPL-MCNC: 4.2 G/DL (ref 3.5–5)
ALP SERPL-CCNC: 89 U/L (ref 38–126)
ALT SERPL-CCNC: 19 U/L (ref 4–34)
ANION GAP SERPL CALC-SCNC: 1 MMOL/L
APTT BLD: 19.1 SEC (ref 22–30)
AST SERPL-CCNC: 28 U/L (ref 14–36)
BASOPHILS # BLD AUTO: 0 K/UL (ref 0–0.2)
BASOPHILS NFR BLD AUTO: 0 %
BUN SERPL-SCNC: 25 MG/DL (ref 7–17)
CALCIUM SPEC-MCNC: 10.1 MG/DL (ref 8.4–10.2)
CHLORIDE SERPL-SCNC: 106 MMOL/L (ref 98–107)
CO2 SERPL-SCNC: 28 MMOL/L (ref 22–30)
EOSINOPHIL # BLD AUTO: 0 K/UL (ref 0–0.7)
EOSINOPHIL NFR BLD AUTO: 0 %
ERYTHROCYTE [DISTWIDTH] IN BLOOD BY AUTOMATED COUNT: 3.93 M/UL (ref 3.8–5.4)
ERYTHROCYTE [DISTWIDTH] IN BLOOD: 13.3 % (ref 11.5–15.5)
GLUCOSE SERPL-MCNC: 118 MG/DL (ref 74–99)
HCT VFR BLD AUTO: 37.2 % (ref 34–46)
HGB BLD-MCNC: 12.2 GM/DL (ref 11.4–16)
INR PPP: 0.9 (ref ?–1.2)
LYMPHOCYTES # SPEC AUTO: 0.6 K/UL (ref 1–4.8)
LYMPHOCYTES NFR SPEC AUTO: 3 %
MAGNESIUM SPEC-SCNC: 1.8 MG/DL (ref 1.6–2.3)
MCH RBC QN AUTO: 31 PG (ref 25–35)
MCHC RBC AUTO-ENTMCNC: 32.8 G/DL (ref 31–37)
MCV RBC AUTO: 94.6 FL (ref 80–100)
MONOCYTES # BLD AUTO: 1 K/UL (ref 0–1)
MONOCYTES NFR BLD AUTO: 5 %
NEUTROPHILS # BLD AUTO: 17.3 K/UL (ref 1.3–7.7)
NEUTROPHILS NFR BLD AUTO: 91 %
PLATELET # BLD AUTO: 276 K/UL (ref 150–450)
POTASSIUM SERPL-SCNC: 3.7 MMOL/L (ref 3.5–5.1)
PROT SERPL-MCNC: 6.4 G/DL (ref 6.3–8.2)
PT BLD: 10.5 SEC (ref 10–12.5)
SODIUM SERPL-SCNC: 135 MMOL/L (ref 137–145)
WBC # BLD AUTO: 18.9 K/UL (ref 3.8–10.6)

## 2024-12-13 PROCEDURE — 85025 COMPLETE CBC W/AUTO DIFF WBC: CPT

## 2024-12-13 PROCEDURE — 99291 CRITICAL CARE FIRST HOUR: CPT

## 2024-12-13 PROCEDURE — 90715 TDAP VACCINE 7 YRS/> IM: CPT

## 2024-12-13 PROCEDURE — 36415 COLL VENOUS BLD VENIPUNCTURE: CPT

## 2024-12-13 PROCEDURE — 96374 THER/PROPH/DIAG INJ IV PUSH: CPT

## 2024-12-13 PROCEDURE — 80053 COMPREHEN METABOLIC PANEL: CPT

## 2024-12-13 PROCEDURE — 70450 CT HEAD/BRAIN W/O DYE: CPT

## 2024-12-13 PROCEDURE — 90471 IMMUNIZATION ADMIN: CPT

## 2024-12-13 PROCEDURE — 87636 SARSCOV2 & INF A&B AMP PRB: CPT

## 2024-12-13 PROCEDURE — 73030 X-RAY EXAM OF SHOULDER: CPT

## 2024-12-13 PROCEDURE — 96375 TX/PRO/DX INJ NEW DRUG ADDON: CPT

## 2024-12-13 PROCEDURE — 83735 ASSAY OF MAGNESIUM: CPT

## 2024-12-13 PROCEDURE — 85610 PROTHROMBIN TIME: CPT

## 2024-12-13 PROCEDURE — 71046 X-RAY EXAM CHEST 2 VIEWS: CPT

## 2024-12-13 PROCEDURE — 12001 RPR S/N/AX/GEN/TRNK 2.5CM/<: CPT

## 2024-12-13 PROCEDURE — 84484 ASSAY OF TROPONIN QUANT: CPT

## 2024-12-13 PROCEDURE — 72125 CT NECK SPINE W/O DYE: CPT

## 2024-12-13 PROCEDURE — 85730 THROMBOPLASTIN TIME PARTIAL: CPT

## 2024-12-13 PROCEDURE — 93005 ELECTROCARDIOGRAM TRACING: CPT

## 2024-12-13 RX ADMIN — TRANEXAMIC ACID ONE MLS/HR: 10 INJECTION, SOLUTION INTRAVENOUS at 15:48

## 2024-12-13 RX ADMIN — LEVETIRACETAM STA MG: 100 INJECTION, SOLUTION, CONCENTRATE INTRAVENOUS at 15:04

## 2024-12-13 RX ADMIN — TETANUS TOXOID, REDUCED DIPHTHERIA TOXOID AND ACELLULAR PERTUSSIS VACCINE, ADSORBED ONE ML: 5; 2.5; 8; 8; 2.5 SUSPENSION INTRAMUSCULAR at 15:08

## 2024-12-13 NOTE — XR
EXAMINATION TYPE: XR chest 2V

 

DATE OF EXAM: 12/13/2024 3:43 PM

 

COMPARISON: 5/3/2014 

 

CLINICAL INDICATION: Female, 84 years old with history of syncope,

 

TECHNIQUE: XR chest 2V view(s) obtained.

 

FINDINGS:  

The heart size is enlarged.  There may be some fullness in the right hilar region. Follow-up for unde
rlying mass is recommended.

The pulmonary vasculature is normal.  

The lungs are clear.  No pneumothorax is evident. There is a second rib fracture.

There is depression of the acromion in relation to the distal clavicle compatible with partial separa
tion. Additionally, there is fracture or avulsion from the superior acromion on the right.

 

IMPRESSION:  

1. No acute pulmonary process.

2. Right Acromial fracture with some degree of separation.

3. Right third rib fracture. No pneumothorax identified.

4. Cardiomegaly. 

5. There may be right hilar mass.  Additional workup is recommended.

 

[facility], Workstation: [id number], 12/13/2024 3:54 PM

## 2024-12-13 NOTE — ED
Fall HPI





- General


Chief Complaint: Fall


Stated Complaint: Fall


Time Seen by Provider: 12/13/24 13:59


Source: patient, EMS, RN notes reviewed, old records reviewed


Mode of arrival: EMS


Limitations: no limitations





- History of Present Illness


Initial Comments: 


84-year-old female presented to ER via EMS for evaluation of fall. Patient's 

, at bedside, aiding in HPI.  Patient's  reports that she went 

downstairs and he did not hear from her for about 15 to 20 minutes.  He states 

he got up and found without of the stairs.  He then called EMS for assistance.  

This occurred around 1230/1 PM. Per EMS, patient was found at the bottom of the 

staircase.  They state there was 10 stairs a platform and then 3 stairs again.  

Patient was found at the bottom of the 3 stairs.  They report it did not appear 

as if she fell down about 10 stairs. They also report a 1.5 cm laceration to her

scalp.  Patient is unaware of how she fell or what led her to fall.  She does 

have a history of atrial fibrillation but denies blood thinner use.  She is 

currently complaining of right shoulder pain.  She denies any current dizziness,

lightheadedness, double blurry vision, nausea or vomiting.  Patient denies chest

pain, shortness of breath, abdominal pain, constipation/diarrhea, fevers, chills

or peripheral edema.





- Related Data


                                Home Medications











 Medication  Instructions  Recorded  Confirmed


 


Lisinopril-Hctz 20-25 mg 1 each PO DAILY 05/03/14 08/17/23





[Zestoretic 20-25]   


 


dilTIAZem HCL [Diltiazem ER (BID)] 90 mg PO BID 05/03/14 08/17/23


 


Atorvastatin [Lipitor] 10 mg PO HS 12/10/18 08/17/23


 


Sodium Chloride 5% Ophth Soln 1 drops RIGHT EYE BID 12/10/18 08/17/23





[Lenin 128]   


 


prednisoLONE ACETATE 1% OPHTH 1 drops LEFT EYE Q48H 12/10/18 08/17/23





[Pred Forte 1%]   








                                  Previous Rx's











 Medication  Instructions  Recorded


 


Meloxicam [Mobic] 7.5 mg PO DAILY 30 Days #30 tab 08/17/23


 


traMADol HCl [Ultram] 50 mg PO Q4HR PRN 3 Days #18 tab 08/30/23











                                    Allergies











Allergy/AdvReac Type Severity Reaction Status Date / Time


 


Iodinated Contrast Media AdvReac  Nausea & Verified 12/13/24 13:42





[Iodinated Contrast Media -   Vomiting  





IV Dye]     














Review of Systems


ROS Statement: 


Those systems with pertinent positive or pertinent negative responses have been 

documented in the HPI.





ROS Other: All systems not noted in ROS Statement are negative.





Past Medical History


Past Medical History: Atrial Fibrillation, Hyperlipidemia, Hypertension, 

Osteoarthritis (OA)


Additional Past Medical History / Comment(s): BACK PAIN, ENVIRONMENTAL 

ALLERGIES., OCCASIONAL VERTIGO, ECZEMA


History of Any Multi-Drug Resistant Organisms: None Reported


Past Surgical History: Joint Replacement, Orthopedic Surgery, Tubal Ligation


Additional Past Surgical History / Comment(s): BILAT CATARACT SURGERY, CARPAL 

TUNNEL.  LT CORNEAL TRANSPLANT X2.  COLONOSCOPY.  RT TKA, TOTAL LEFT KNEE.  LT 

PARTIAL HIP


Past Anesthesia/Blood Transfusion Reactions: No Reported Reaction


Past Psychological History: No Psychological Hx Reported


Smoking Status: Never smoker


Past Alcohol Use History: None Reported


Past Drug Use History: None Reported





- Past Family History


  ** Mother


Family Medical History: Unable to Obtain


Additional Family Medical History / Comment(s): PT DOES NOT KNOW FAMILY HX.





General Exam


Limitations: no limitations


General appearance: alert, in no apparent distress


Head exam: Present: other (right parietal scalp laceration)


Eye exam: Present: normal appearance, PERRL, EOMI.  Absent: scleral icterus, 

conjunctival injection, periorbital swelling


Pupils: Present: normal accommodation (3mm bilaterally)


ENT exam: Present: normal exam, normal oropharynx, mucous membranes moist


Neck exam: Present: normal inspection.  Absent: tenderness, meningismus, lymph

adenopathy


Respiratory exam: Present: normal lung sounds bilaterally.  Absent: respiratory 

distress, wheezes, rales, rhonchi, stridor


Cardiovascular Exam: Present: regular rate, irregular rhythm, normal heart 

sounds


GI/Abdominal exam: Present: soft, normal bowel sounds.  Absent: distended, 

tenderness, guarding, rebound, rigid


Extremities exam: Present: tenderness (Right AC joint.  2+ bilateral radial 

pulses.  2+ bilateral DP and PT pulse negative leg roll bilaterally.)


Neurological exam: Present: alert, CN II-XII intact, other (Patient is ANO x 2)





Course


                                   Vital Signs











  12/13/24 12/13/24 12/13/24





  13:37 14:00 14:50


 


Temperature 98 F  


 


Pulse Rate 90 93 88


 


Respiratory 20 20 20





Rate   


 


Blood Pressure 164/107 150/99 153/84


 


O2 Sat by Pulse 95 97 94 L





Oximetry   














  12/13/24





  15:55


 


Temperature 98.1 F


 


Pulse Rate 95


 


Respiratory 17





Rate 


 


Blood Pressure 146/82


 


O2 Sat by Pulse 98





Oximetry 














- Reevaluation(s)


Reevaluation #1: 





12/13/24 15:44


Case discussed with [facility] trauma surgeon,  who advised on 

starting TXA. 


Case also discussed with [facility] Dr. Goldberger, neurosurgey, who 

accepted transfer.





Medical Decision Making





- Medical Decision Making


Was pt. sent in by a medical professional or institution (, PA, NP, urgent 

care, hospital, or nursing home...) When possible be specific


@  -No


Did you speak to anyone other than the patient for history (EMS, parent, family,

police, friend...)? What history was obtained from this source 


@  -EMS and  provided majority of HPI.  As patient is unaware of events 

causing fall.


Did you review nursing and triage notes (agree or disagree)?  Why? 


@  -I reviewed and agree with nursing and triage notes


Were old charts reviewed (outside hosp., previous admission, EMS record, old 

EKG, old radiological studies, urgent care reports/EKG's, nursing home records)?

Report findings 


@  -No old charts were reviewed


Differential Diagnosis (chest pain, altered mental status, abdominal pain women,

abdominal pain men, vaginal bleeding, weakness, fever, dyspnea, syncope, 

headache, dizziness, GI bleed, back pain, seizure, CVA, palpatations, mental 

health, musculoskeletal)? 


@  -Fracture, dislocation, contusion, hematoma, intracranial hemorrhage, 

concussion, abrasion, laceration this list does not like to be all-inclusive


EKG interpreted by me (3pts min.).


@  -As above


X-rays interpreted by me (1pt min.).


@  -None done


CT interpreted by me (1pt min.).


@  -CT brain and C-spine showing acute left subdural hematoma overlying the left

frontal and temporal convexities.  No midline shift.  Acute right parietal scalp

hematoma with laceration.  No evidence of cervical spine fracture or 

dislocation.


U/S interpreted by me (1pt. min.).


@  -None done


What testing was considered but not performed or refused? (CT, X-rays, U/S, 

labs)? Why?


@  -None


What meds were considered but not given or refused? Why?


@  -None


Did you discuss the management of the patient with other professionals 

(professionals i.e. , PA, NP, lab, RT, psych nurse, , , 

teacher, , )? Give summary


@  -Yes, case discussed with [facility] for transfer due to subdural 

hematoma. , trauma surgery, advised on TXA.  Case discussed with 

[facility] neurosurgery, Dr. Goldberger, who accepted transfer. 


Was smoking cessation discussed for >3mins.?


@  -No


Was critical care preformed (if so, how long)?


@  -Yes, 31 minutes


Were there social determinants of health that impacted care today? How? 

(Homelessness, low income, unemployed, alcoholism, drug addiction, 

transportation, low edu. Level, literacy, decrease access to med. care, assisted, 

rehab)?


@  -No


Was there de-escalation of care discussed even if they declined (Discuss DNR or 

withdrawal of care, Hospice)? DNR status


@  -No


What co-morbidities impacted this encounter? (DM, HTN, Smoking, COPD, CAD, 

Cancer, CVA, ARF, Chemo, Hep., AIDS, mental health diagnosis, sleep apnea, 

morbid obesity)?


@  -Atrial fibrillation no blood thinners, hypertension, hyperlipidemia


Was patient admitted / discharged? Hospital course, mention meds given and 

route, prescriptions, significant lab abnormalities, going to OR and other 

pertinent info.


@  -Transferred.  84-year-old female presented to the ER via EMS for evaluation 

of a fall. History and physical exam completed. Patient is hypertensive on 

arrival at 164/107 vitals otherwise stable.  Patient AxO x 2.  Pupils equal 

round reactive.  Bilateral upper and lower extremities neurovascular intact.  

Tenderness to right AC joint.  GCS 14. Syncopal workup will be obtained as 

patient is unaware of how she fell.  CT brain and C-spine showed an acute left 

subdural hematoma overlying the left frontal and temporal convexities with no 

midline shift.  Acute right parietal scalp hematoma with laceration.  No 

evidence of cervical spine fracture or dislocation.  Transfer was considered at 

that time to [facility] for further evaluation and treatment of subdural 

hematoma.  Case was discussed with Dr. Ga, trauma surgery, and Dr. Goldberger, neurosurgery, [facility] who accept transfer. Tetanus updated.  

Patient given 1500 mg of IV push Keppra and 1 g TXA (per trauma surgery [facility]) prior to transfer.  Laboratory studies obtained showing a leukocytosis 

of 18.9 with a left shift.  CMP unremarkable.  PT 10.5, INR 0.9, APTT 19.1.  

Troponin 0.015.  Viral swabs negative. Xrays pending. Laceration deferred to 

[facility] as patient is currently in a c-collar.  Patient remained stable 

throughout ER stay and will be transferred via EMS to [facility].  Case was 

discussed with ED attending Dr. Valencia.


Undiagnosed new problem with uncertain prognosis?


@  -No


Drug Therapy requiring intensive monitoring for toxicity (Heparin, Nitro, 

Insulin, Cardizem)?


@  -No


Were any procedures done?


@  -No


Diagnosis/symptom?


@  -Left subdural hematoma/fall/laceration


Acute, or Chronic, or Acute on Chronic?


@  -Acute


Uncomplicated (without systemic symptoms) or Complicated (systemic symptoms)?


@  -Complicated


Side effects of treatment?


@  -No


Exacerbation, Progression, or Severe Exacerbation?


@  -No


Poses a threat to life or bodily function? How? (Chest pain, USA, MI, pneumonia,

PE, COPD, DKA, ARF, appy, cholecystitis, CVA, Diverticulitis, Homicidal, 

Suicidal, threat to staff... and all critical care pts)


@  -Yes, brain bleeds are life-threatening.








- Lab Data


Result diagrams: 


                                 12/13/24 14:34





                                 12/13/24 14:34


                                   Lab Results











  12/13/24 12/13/24 12/13/24 Range/Units





  14:34 14:34 14:34 


 


WBC  18.9 H    (3.8-10.6)  k/uL


 


RBC  3.93    (3.80-5.40)  m/uL


 


Hgb  12.2    (11.4-16.0)  gm/dL


 


Hct  37.2    (34.0-46.0)  %


 


MCV  94.6    (80.0-100.0)  fL


 


MCH  31.0    (25.0-35.0)  pg


 


MCHC  32.8    (31.0-37.0)  g/dL


 


RDW  13.3    (11.5-15.5)  %


 


Plt Count  276    (150-450)  k/uL


 


MPV  8.6    


 


Neutrophils %  91    %


 


Lymphocytes %  3    %


 


Monocytes %  5    %


 


Eosinophils %  0    %


 


Basophils %  0    %


 


Neutrophils #  17.3 H    (1.3-7.7)  k/uL


 


Lymphocytes #  0.6 L    (1.0-4.8)  k/uL


 


Monocytes #  1.0    (0-1.0)  k/uL


 


Eosinophils #  0.0    (0-0.7)  k/uL


 


Basophils #  0.0    (0-0.2)  k/uL


 


PT     (10.0-12.5)  sec


 


INR     (<1.2)  


 


APTT     (22.0-30.0)  sec


 


Sodium   135 L   (137-145)  mmol/L


 


Potassium   3.7   (3.5-5.1)  mmol/L


 


Chloride   106   ()  mmol/L


 


Carbon Dioxide   28   (22-30)  mmol/L


 


Anion Gap   1   mmol/L


 


BUN   25 H   (7-17)  mg/dL


 


Creatinine   0.59   (0.52-1.04)  mg/dL


 


Est GFR (CKD-EPI)AfAm   >90   (>60 ml/min/1.73 sqM)  


 


Est GFR (CKD-EPI)NonAf   86   (>60 ml/min/1.73 sqM)  


 


Glucose   118 H   (74-99)  mg/dL


 


Calcium   10.1   (8.4-10.2)  mg/dL


 


Magnesium   1.8   (1.6-2.3)  mg/dL


 


Total Bilirubin   0.4   (0.2-1.3)  mg/dL


 


AST   28   (14-36)  U/L


 


ALT   19   (4-34)  U/L


 


Alkaline Phosphatase   89   ()  U/L


 


Troponin I     (0.000-0.034)  ng/mL


 


Total Protein   6.4   (6.3-8.2)  g/dL


 


Albumin   4.2   (3.5-5.0)  g/dL


 


Influenza Type A (PCR)    Not Detected  (Not Detectd)  


 


Influenza Type B (PCR)    Not Detected  (Not Detectd)  


 


RSV (PCR)    Not Detected  (Not Detectd)  


 


SARS-CoV-2 (PCR)    Not Detected  (Not Detectd)  














  12/13/24 12/13/24 Range/Units





  14:34 14:34 


 


WBC    (3.8-10.6)  k/uL


 


RBC    (3.80-5.40)  m/uL


 


Hgb    (11.4-16.0)  gm/dL


 


Hct    (34.0-46.0)  %


 


MCV    (80.0-100.0)  fL


 


MCH    (25.0-35.0)  pg


 


MCHC    (31.0-37.0)  g/dL


 


RDW    (11.5-15.5)  %


 


Plt Count    (150-450)  k/uL


 


MPV    


 


Neutrophils %    %


 


Lymphocytes %    %


 


Monocytes %    %


 


Eosinophils %    %


 


Basophils %    %


 


Neutrophils #    (1.3-7.7)  k/uL


 


Lymphocytes #    (1.0-4.8)  k/uL


 


Monocytes #    (0-1.0)  k/uL


 


Eosinophils #    (0-0.7)  k/uL


 


Basophils #    (0-0.2)  k/uL


 


PT  10.5   (10.0-12.5)  sec


 


INR  0.9   (<1.2)  


 


APTT  19.1 L   (22.0-30.0)  sec


 


Sodium    (137-145)  mmol/L


 


Potassium    (3.5-5.1)  mmol/L


 


Chloride    ()  mmol/L


 


Carbon Dioxide    (22-30)  mmol/L


 


Anion Gap    mmol/L


 


BUN    (7-17)  mg/dL


 


Creatinine    (0.52-1.04)  mg/dL


 


Est GFR (CKD-EPI)AfAm    (>60 ml/min/1.73 sqM)  


 


Est GFR (CKD-EPI)NonAf    (>60 ml/min/1.73 sqM)  


 


Glucose    (74-99)  mg/dL


 


Calcium    (8.4-10.2)  mg/dL


 


Magnesium    (1.6-2.3)  mg/dL


 


Total Bilirubin    (0.2-1.3)  mg/dL


 


AST    (14-36)  U/L


 


ALT    (4-34)  U/L


 


Alkaline Phosphatase    ()  U/L


 


Troponin I   0.015  (0.000-0.034)  ng/mL


 


Total Protein    (6.3-8.2)  g/dL


 


Albumin    (3.5-5.0)  g/dL


 


Influenza Type A (PCR)    (Not Detectd)  


 


Influenza Type B (PCR)    (Not Detectd)  


 


RSV (PCR)    (Not Detectd)  


 


SARS-CoV-2 (PCR)    (Not Detectd)  














- EKG Data


-: EKG Interpreted by Me


EKG Comments: 





EKG taken at 13: 42 showing sinus tachycardia with occasional PVCs.  Ventricular

rate 105, VA interval 131, QRS duration 85, QT/QTc 321/382.





- Radiology Data


Radiology results: report reviewed, image reviewed





Disposition


Clinical Impression: 


 Fall, Subdural hematoma, acute, Laceration





Disposition: OTHER INSTITUTION NOT DEFINED


Condition: Stable


Referrals: 


Hemant Cruz MD [Primary Care Provider] - 1-2 days


Time of Disposition: 15:04





- Out of Hospital Transfer - Req. Specs


Out of Hospital Transfer - Requested Specifics: Other Emergency Center ([facility])

## 2024-12-13 NOTE — CT
EXAMINATION TYPE: CT brain cspine wo con

CT DLP: 1414.4 mGycm, Automated exposure control for dose reduction was used.

 

DATE OF EXAM: 12/13/2024 2:28 PM

 

COMPARISON: None.. 

 

CLINICAL INDICATION:Female, 84 years old with history of fall; FALL

 

TECHNIQUE: 

Brain: Multiple axial CT images of the brain were obtained without IV contrast. 

Cspine: Axial CT images from the skull base to the inferior aspect of T2 we obtained without intraven
ous contrast. Coronal and sagittal reformatted images were also reviewed. 

 

FINDINGS:

 

Brain:

Extra-axial spaces: Hyperdense fluid along the left frontal and temporal convexity measuring up to 4 
mm in thickness. 

Ventricular system: Within normal limits

Cerebral parenchyma: No acute intraparenchymal hemorrhage or mass effect.  Encephalomalacia identifie
d within the left frontal lobe from remote injury. The gray-white junction is well differentiated. Sc
attered hypoattenuating areas are seen within the periventricular subcortical white matter.  

Cerebellum: Unremarkable.

Mass effect: No evidence of midline shift.

Intracranial vasculature: Atherosclerotic calcifications of the intracranial vessels.

Soft tissues: Acute right parietal scalp hematoma with single focus of soft tissue gas consistent wit
h laceration. This measures up to 1.2 cm in thickness.

Calvarium/osseous structures: No depressed skull fracture.

Paranasal sinuses and mastoid air cells: The left mastoid air cells are clear. Partial opacification 
of the right mastoid air cells and middle ear. Moderate mucosal thickening in the left sphenoid sinus
. Aplasia of the bilateral frontal sinuses.

Visualized orbits: Bilateral aphakia

 

Cervical spine:

Fracture: None.

Osseous structures: Multilevel degenerative disc disease changes with endplate spurring and disc oste
ophyte complex's. 

Vertebral alignment: Within normal limits.

Spinal canal/Neural Foramina: Disc osteophyte complexes at C4-C5, C5-C6, C6-C7 with at least mild spi
nal canal stenosis. Facet joint uncovertebral joint arthropathy scattered throughout the cervical spi
ne with varying degrees of neural foraminal stenosis.

Neck soft tissues: Prevertebral soft tissues are within normal limits.

Other: The airway is patent. Right apical linear scarring. Right carotid bulb calcification.

 

IMPRESSION:

1.  Acute left subdural hematoma overlying the left frontal and temporal convexities. No midline shif
t.

2.  Nonspecific white matter changes, likely secondary to chronic small vessel ischemic disease.

3.  Acute right parietal scalp hematoma with laceration.

4.  Right mastoid effusion with opacification of the middle ear.

5.  No evidence of cervical spine fracture.

6.  Mild to moderate multilevel degenerative disc disease.

 

Findings called to and discussed MELANIE Palma with at 2:50 PM on 12/13/2024.

 

[facility], Workstation: [id number], 12/13/2024 2:50 PM

## 2024-12-13 NOTE — XR
EXAMINATION TYPE: XR shoulder complete RT

 

DATE OF EXAM: 12/13/2024 3:43 PM

 

COMPARISON: None. 

 

CLINICAL INDICATION: Female, 84 years old with history of pain s/p fall,

 

TECHNIQUE: XR shoulder complete RT view(s) obtained.

 

FINDINGS: 

The humeral head articulates with the glenoid.

The acromio-clavicular junction is normal.

There may be a fracture of the acromion at the acromioclavicular junction. There is mild elevation of
 the distal clavicle relation to the acromion. Mild separation may be present

 

A follow up study can be performed 7-10 days from acute trauma for continued pain.  MRI can be perfor
med if soft tissue evaluation would be of benefit.

 

IMPRESSION:

1. Suspected fracture of the acromion at the acromioclavicular junction with mild elevation of the cl
avicle in relation to the acromion. AC joint separation should also be considered. 

 

[facility], Workstation: [id number], 12/13/2024 3:48 PM